# Patient Record
Sex: FEMALE | Race: WHITE | NOT HISPANIC OR LATINO | Employment: PART TIME | URBAN - METROPOLITAN AREA
[De-identification: names, ages, dates, MRNs, and addresses within clinical notes are randomized per-mention and may not be internally consistent; named-entity substitution may affect disease eponyms.]

---

## 2017-06-14 DIAGNOSIS — C50.912 MALIGNANT NEOPLASM OF LEFT FEMALE BREAST (HCC): ICD-10-CM

## 2017-06-14 DIAGNOSIS — Z12.31 ENCOUNTER FOR SCREENING MAMMOGRAM FOR MALIGNANT NEOPLASM OF BREAST: ICD-10-CM

## 2017-06-15 ENCOUNTER — ALLSCRIPTS OFFICE VISIT (OUTPATIENT)
Dept: OTHER | Facility: OTHER | Age: 62
End: 2017-06-15

## 2017-06-16 ENCOUNTER — GENERIC CONVERSION - ENCOUNTER (OUTPATIENT)
Dept: OTHER | Facility: OTHER | Age: 62
End: 2017-06-16

## 2017-06-22 ENCOUNTER — LAB CONVERSION - ENCOUNTER (OUTPATIENT)
Dept: OTHER | Facility: OTHER | Age: 62
End: 2017-06-22

## 2017-06-22 LAB
ADDITIONAL INFORMATION (HISTORICAL): NORMAL
ADEQUACY: (HISTORICAL): NORMAL
COMMENT (HISTORICAL): NORMAL
CYTOTECHNOLOGIST: (HISTORICAL): NORMAL
INTERPRETATION (HISTORICAL): NORMAL
LMP (HISTORICAL): NORMAL
PREV. BX: (HISTORICAL): NORMAL
PREV. PAP (HISTORICAL): NORMAL
SOURCE (HISTORICAL): NORMAL

## 2017-08-09 ENCOUNTER — GENERIC CONVERSION - ENCOUNTER (OUTPATIENT)
Dept: OTHER | Facility: OTHER | Age: 62
End: 2017-08-09

## 2018-01-13 VITALS
SYSTOLIC BLOOD PRESSURE: 128 MMHG | HEIGHT: 58 IN | WEIGHT: 148.25 LBS | BODY MASS INDEX: 31.12 KG/M2 | DIASTOLIC BLOOD PRESSURE: 82 MMHG

## 2018-06-14 ENCOUNTER — ANNUAL EXAM (OUTPATIENT)
Dept: GYNECOLOGY | Facility: CLINIC | Age: 63
End: 2018-06-14
Payer: COMMERCIAL

## 2018-06-14 VITALS
HEIGHT: 57 IN | SYSTOLIC BLOOD PRESSURE: 120 MMHG | WEIGHT: 150.2 LBS | DIASTOLIC BLOOD PRESSURE: 62 MMHG | BODY MASS INDEX: 32.4 KG/M2

## 2018-06-14 DIAGNOSIS — N95.2 ATROPHIC VAGINITIS: ICD-10-CM

## 2018-06-14 DIAGNOSIS — Z01.419 ENCOUNTER FOR GYNECOLOGICAL EXAMINATION WITH PAPANICOLAOU SMEAR OF CERVIX: ICD-10-CM

## 2018-06-14 DIAGNOSIS — Z12.31 ENCOUNTER FOR SCREENING MAMMOGRAM FOR MALIGNANT NEOPLASM OF BREAST: ICD-10-CM

## 2018-06-14 DIAGNOSIS — Z90.12 H/O LEFT MASTECTOMY: ICD-10-CM

## 2018-06-14 DIAGNOSIS — Z13.820 SCREENING FOR OSTEOPOROSIS: Primary | ICD-10-CM

## 2018-06-14 DIAGNOSIS — Z01.419 ENCOUNTER FOR GYNECOLOGICAL EXAMINATION WITHOUT ABNORMAL FINDING: ICD-10-CM

## 2018-06-14 PROCEDURE — 76977 US BONE DENSITY MEASURE: CPT | Performed by: OBSTETRICS & GYNECOLOGY

## 2018-06-14 PROCEDURE — 99396 PREV VISIT EST AGE 40-64: CPT | Performed by: OBSTETRICS & GYNECOLOGY

## 2018-06-14 RX ORDER — BUTALBITAL, ACETAMINOPHEN AND CAFFEINE 50; 325; 40 MG/1; MG/1; MG/1
1 TABLET ORAL
COMMUNITY
Start: 2014-06-30

## 2018-06-14 RX ORDER — PROCHLORPERAZINE MALEATE 5 MG/1
1 TABLET ORAL EVERY 6 HOURS PRN
COMMUNITY
Start: 2014-06-30

## 2018-06-14 RX ORDER — SIMVASTATIN 10 MG
10 TABLET ORAL
COMMUNITY

## 2018-06-14 RX ORDER — MULTIVITAMIN WITH IRON
TABLET ORAL
COMMUNITY

## 2018-06-14 NOTE — PROGRESS NOTES
Assessment/Plan:    No problem-specific Assessment & Plan notes found for this encounter  Diagnoses and all orders for this visit:    Screening for osteoporosis: +2 3    Encounter for screening mammogram for malignant neoplasm of breast  -     Mammo screening right w 3d & cad; Future    H/O left mastectomy  -     Mammo screening right w 3d & cad; Future    Atrophic vaginitis    Encounter for gynecological examination without abnormal finding    Other orders  -     Cancel: Mammo screening bilateral w 3d & cad; Future  -     butalbital-acetaminophen-caffeine (FIORICET,ESGIC) -40 mg per tablet; Take 1 tablet by mouth  -     Calcium Carb-Cholecalciferol (CALCIUM 1000 + D) 1000-800 MG-UNIT TABS; Take by mouth  -     Multiple Vitamin (MULTI-DAY VITAMINS PO); Take by mouth  -     prochlorperazine (COMPAZINE) 5 mg tablet; Take 1 tablet by mouth every 6 (six) hours as needed  -     pyridoxine (VITAMIN B6) 100 mg tablet; Take by mouth  -     simvastatin (ZOCOR) 10 mg tablet; Take 10 mg by mouth daily at bedtime        Subjective:      Patient ID: Leandro Escoto is a 58 y o  female  HPI  Annual exam  No c/o  Hx Lt breast ca age 51, BrCa negative  S/P Lt mastectomy with reconstruction    Colonoscopy 2014 ( 10 )    The following portions of the patient's history were reviewed and updated as appropriate: allergies, current medications, past family history, past medical history, past social history, past surgical history and problem list     Review of Systems   Constitutional: Negative  Gastrointestinal: Negative  Genitourinary: Negative  Objective:      /62   Ht 4' 9" (1 448 m)   Wt 68 1 kg (150 lb 3 2 oz)   BMI 32 50 kg/m²          Physical Exam   Constitutional: She appears well-developed and well-nourished  Neck: Normal range of motion  Neck supple  No thyromegaly present  Cardiovascular: Normal rate, regular rhythm and normal heart sounds      Pulmonary/Chest: Effort normal and breath sounds normal  Right breast exhibits no inverted nipple, no mass, no nipple discharge, no skin change and no tenderness  S/P Lt mastectomy with reconstruction   Abdominal: Soft  Bowel sounds are normal  She exhibits no distension and no mass  There is no tenderness  Hernia confirmed negative in the right inguinal area and confirmed negative in the left inguinal area  Genitourinary: Uterus normal  There is no rash or lesion on the right labia  There is no rash or lesion on the left labia  Right adnexum displays no mass, no tenderness and no fullness  Left adnexum displays no mass, no tenderness and no fullness  No erythema or bleeding in the vagina  No vaginal discharge found  Genitourinary Comments: Extreme narrowing upper vagina, Unable to completely visualize cervix   Lymphadenopathy:        Right: No inguinal adenopathy present  Left: No inguinal adenopathy present

## 2018-06-15 LAB
CLINICAL INFO: NORMAL
DATE PREVIOUS BX: NORMAL
LMP START DATE: NORMAL
SL AMB PREV. PAP:: NORMAL
SPECIMEN SOURCE CVX/VAG CYTO: NORMAL
STAT OF ADQ CVX/VAG CYTO-IMP: NORMAL

## 2019-06-06 ENCOUNTER — ANNUAL EXAM (OUTPATIENT)
Dept: GYNECOLOGY | Facility: CLINIC | Age: 64
End: 2019-06-06
Payer: COMMERCIAL

## 2019-06-06 VITALS
HEART RATE: 91 BPM | SYSTOLIC BLOOD PRESSURE: 140 MMHG | DIASTOLIC BLOOD PRESSURE: 90 MMHG | HEIGHT: 58 IN | WEIGHT: 151.6 LBS | BODY MASS INDEX: 31.82 KG/M2

## 2019-06-06 DIAGNOSIS — Z85.3 HISTORY OF LEFT BREAST CANCER: ICD-10-CM

## 2019-06-06 DIAGNOSIS — Z12.4 ENCOUNTER FOR PAPANICOLAOU SMEAR FOR CERVICAL CANCER SCREENING: ICD-10-CM

## 2019-06-06 DIAGNOSIS — Z12.31 ENCOUNTER FOR SCREENING MAMMOGRAM FOR MALIGNANT NEOPLASM OF BREAST: Primary | ICD-10-CM

## 2019-06-06 DIAGNOSIS — N95.2 ATROPHIC VAGINITIS: ICD-10-CM

## 2019-06-06 PROCEDURE — S0612 ANNUAL GYNECOLOGICAL EXAMINA: HCPCS | Performed by: OBSTETRICS & GYNECOLOGY

## 2019-06-06 PROCEDURE — G0145 SCR C/V CYTO,THINLAYER,RESCR: HCPCS | Performed by: OBSTETRICS & GYNECOLOGY

## 2019-06-06 RX ORDER — FLUTICASONE PROPIONATE 50 MCG
1 SPRAY, SUSPENSION (ML) NASAL DAILY
COMMUNITY

## 2019-06-11 LAB
LAB AP GYN PRIMARY INTERPRETATION: NORMAL
Lab: NORMAL

## 2020-05-29 ENCOUNTER — ANNUAL EXAM (OUTPATIENT)
Dept: GYNECOLOGY | Facility: CLINIC | Age: 65
End: 2020-05-29
Payer: COMMERCIAL

## 2020-05-29 VITALS
DIASTOLIC BLOOD PRESSURE: 90 MMHG | WEIGHT: 152.8 LBS | HEIGHT: 58 IN | SYSTOLIC BLOOD PRESSURE: 150 MMHG | BODY MASS INDEX: 32.07 KG/M2 | HEART RATE: 86 BPM

## 2020-05-29 DIAGNOSIS — Z13.820 SCREENING FOR OSTEOPOROSIS: ICD-10-CM

## 2020-05-29 DIAGNOSIS — N95.2 ATROPHIC VAGINITIS: ICD-10-CM

## 2020-05-29 DIAGNOSIS — Z01.419 ENCOUNTER FOR GYNECOLOGICAL EXAMINATION WITHOUT ABNORMAL FINDING: ICD-10-CM

## 2020-05-29 DIAGNOSIS — Z12.31 ENCOUNTER FOR SCREENING MAMMOGRAM FOR MALIGNANT NEOPLASM OF BREAST: Primary | ICD-10-CM

## 2020-05-29 DIAGNOSIS — Z12.4 ENCOUNTER FOR PAPANICOLAOU SMEAR FOR CERVICAL CANCER SCREENING: ICD-10-CM

## 2020-05-29 DIAGNOSIS — Z85.3 HISTORY OF LEFT BREAST CANCER: ICD-10-CM

## 2020-05-29 PROBLEM — C50.919 BREAST CANCER (HCC): Status: ACTIVE | Noted: 2020-05-29

## 2020-05-29 PROCEDURE — 76977 US BONE DENSITY MEASURE: CPT | Performed by: OBSTETRICS & GYNECOLOGY

## 2020-05-29 PROCEDURE — S0612 ANNUAL GYNECOLOGICAL EXAMINA: HCPCS | Performed by: OBSTETRICS & GYNECOLOGY

## 2020-05-29 PROCEDURE — G0145 SCR C/V CYTO,THINLAYER,RESCR: HCPCS | Performed by: OBSTETRICS & GYNECOLOGY

## 2020-06-08 LAB
LAB AP GYN PRIMARY INTERPRETATION: NORMAL
Lab: NORMAL

## 2021-05-26 ENCOUNTER — ANNUAL EXAM (OUTPATIENT)
Dept: GYNECOLOGY | Facility: CLINIC | Age: 66
End: 2021-05-26
Payer: COMMERCIAL

## 2021-05-26 VITALS
HEART RATE: 89 BPM | DIASTOLIC BLOOD PRESSURE: 82 MMHG | BODY MASS INDEX: 30.04 KG/M2 | WEIGHT: 149 LBS | SYSTOLIC BLOOD PRESSURE: 140 MMHG | HEIGHT: 59 IN

## 2021-05-26 DIAGNOSIS — Z01.419 ENCOUNTER FOR GYNECOLOGICAL EXAMINATION WITH PAPANICOLAOU SMEAR OF CERVIX: Primary | ICD-10-CM

## 2021-05-26 DIAGNOSIS — C50.419 MALIGNANT NEOPLASM OF UPPER-OUTER QUADRANT OF FEMALE BREAST, UNSPECIFIED ESTROGEN RECEPTOR STATUS, UNSPECIFIED LATERALITY (HCC): ICD-10-CM

## 2021-05-26 PROCEDURE — G0145 SCR C/V CYTO,THINLAYER,RESCR: HCPCS | Performed by: OBSTETRICS & GYNECOLOGY

## 2021-05-26 PROCEDURE — 99397 PER PM REEVAL EST PAT 65+ YR: CPT | Performed by: OBSTETRICS & GYNECOLOGY

## 2021-05-26 NOTE — PROGRESS NOTES
Assessment/Plan:    No problem-specific Assessment & Plan notes found for this encounter  Diagnoses and all orders for this visit:    Encounter for gynecological examination with Papanicolaou smear of cervix  -     Liquid-based pap, screening    Malignant neoplasm of upper-outer quadrant of female breast, unspecified estrogen receptor status, unspecified laterality (HCC)        Subjective:      Patient ID: Mark Hanley is a 78 y o  female  HPI patient presents for annual examination  She offers no complaints  She has a history of left breast cancer diagnosed in   She is status post left mastectomy with reconstruction  She denies any vaginal irritation, burning, discharge or bleeding  Denies any dysuria, hematuria urgency or stress urinary incontinence  No GI complaints  Colonoscopy  normal repeat in 10 years     Osteoporosis screening via heel scan May 2020: Positive 1 4  The following portions of the patient's history were reviewed and updated as appropriate:   She  has a past medical history of Arthritis, Breast cancer ([facility] [address] ), and Menopause  She   Patient Active Problem List    Diagnosis Date Noted    Breast cancer ([facility] ) 2020     She  has a past surgical history that includes  section and Mastectomy  Her family history includes Atrial fibrillation in her mother; Breast cancer in her paternal aunt; Heart defect in her father; Hyperlipidemia in her mother; Hypertension in her mother; Prostate cancer in her maternal uncle  She  reports that she has never smoked  She has never used smokeless tobacco  She reports current alcohol use  She reports that she does not use drugs    Current Outpatient Medications   Medication Sig Dispense Refill    butalbital-acetaminophen-caffeine (FIORICET,ESGIC) -40 mg per tablet Take 1 tablet by mouth      Calcium Carb-Cholecalciferol (CALCIUM 1000 + D) 1000-800 MG-UNIT TABS Take by mouth      fluticasone (FLONASE) 50 mcg/act nasal spray 1 spray into each nostril daily      Misc Natural Products (TUMERSAID PO) Take by mouth      Multiple Vitamin (MULTI-DAY VITAMINS PO) Take by mouth      prochlorperazine (COMPAZINE) 5 mg tablet Take 1 tablet by mouth every 6 (six) hours as needed      pyridoxine (VITAMIN B6) 100 mg tablet Take by mouth      simvastatin (ZOCOR) 10 mg tablet Take 10 mg by mouth daily at bedtime       No current facility-administered medications for this visit  Current Outpatient Medications on File Prior to Visit   Medication Sig    butalbital-acetaminophen-caffeine (FIORICET,ESGIC) -40 mg per tablet Take 1 tablet by mouth    Calcium Carb-Cholecalciferol (CALCIUM 1000 + D) 1000-800 MG-UNIT TABS Take by mouth    fluticasone (FLONASE) 50 mcg/act nasal spray 1 spray into each nostril daily    Misc Natural Products (TUMERSAID PO) Take by mouth    Multiple Vitamin (MULTI-DAY VITAMINS PO) Take by mouth    prochlorperazine (COMPAZINE) 5 mg tablet Take 1 tablet by mouth every 6 (six) hours as needed    pyridoxine (VITAMIN B6) 100 mg tablet Take by mouth    simvastatin (ZOCOR) 10 mg tablet Take 10 mg by mouth daily at bedtime     No current facility-administered medications on file prior to visit  She is allergic to aspirin; pollen extract; and tolmetin       Review of Systems   Constitutional: Negative  HENT: Negative for sore throat and trouble swallowing  Gastrointestinal: Negative  Genitourinary: Negative  Objective:      /82 (BP Location: Right arm, Patient Position: Sitting, Cuff Size: Standard)   Pulse 89   Ht 4' 10 5" (1 486 m)   Wt 67 6 kg (149 lb)   BMI 30 61 kg/m²          Physical Exam  Vitals signs reviewed  Constitutional:       Appearance: Normal appearance  She is normal weight  Neck:      Musculoskeletal: Normal range of motion and neck supple  No muscular tenderness  Cardiovascular:      Rate and Rhythm: Normal rate and regular rhythm        Pulses: Normal pulses  Heart sounds: Normal heart sounds  No murmur  Pulmonary:      Effort: Pulmonary effort is normal  No respiratory distress  Breath sounds: Normal breath sounds  Chest:      Breasts:         Right: No swelling, bleeding, inverted nipple, mass, nipple discharge, skin change or tenderness  Comments: Status post left mastectomy with reconstruction  Abdominal:      General: There is no distension  Palpations: Abdomen is soft  There is no mass  Tenderness: There is no abdominal tenderness  There is no guarding or rebound  Hernia: No hernia is present  There is no hernia in the left inguinal area or right inguinal area  Genitourinary:     General: Normal vulva  Labia:         Right: No rash, tenderness or lesion  Left: No rash, tenderness or lesion  Comments: Extreme narrowing the upper vagina  Unable to visualize cervix  Vagina with atrophic changes  Years feels to be anteverted normal size and contour freely mobile nontender  There are no palpable adnexal masses or tenderness  Lymphadenopathy:      Cervical: No cervical adenopathy  Upper Body:      Right upper body: No supraclavicular, axillary or pectoral adenopathy  Left upper body: No supraclavicular, axillary or pectoral adenopathy  Lower Body: No right inguinal adenopathy  No left inguinal adenopathy  Neurological:      Mental Status: She is alert

## 2021-06-01 LAB
LAB AP GYN PRIMARY INTERPRETATION: NORMAL
Lab: NORMAL

## 2022-05-27 ENCOUNTER — ANNUAL EXAM (OUTPATIENT)
Dept: GYNECOLOGY | Facility: CLINIC | Age: 67
End: 2022-05-27
Payer: COMMERCIAL

## 2022-05-27 VITALS
WEIGHT: 142.8 LBS | SYSTOLIC BLOOD PRESSURE: 130 MMHG | HEIGHT: 57 IN | HEART RATE: 84 BPM | BODY MASS INDEX: 30.81 KG/M2 | DIASTOLIC BLOOD PRESSURE: 92 MMHG

## 2022-05-27 DIAGNOSIS — N95.2 ATROPHIC VAGINITIS: ICD-10-CM

## 2022-05-27 DIAGNOSIS — Z13.820 SCREENING FOR OSTEOPOROSIS: ICD-10-CM

## 2022-05-27 DIAGNOSIS — Z85.3 HISTORY OF LEFT BREAST CANCER: Primary | ICD-10-CM

## 2022-05-27 DIAGNOSIS — Z12.4 ENCOUNTER FOR PAPANICOLAOU SMEAR FOR CERVICAL CANCER SCREENING: ICD-10-CM

## 2022-05-27 PROCEDURE — G0145 SCR C/V CYTO,THINLAYER,RESCR: HCPCS | Performed by: OBSTETRICS & GYNECOLOGY

## 2022-05-27 PROCEDURE — G0101 CA SCREEN;PELVIC/BREAST EXAM: HCPCS | Performed by: OBSTETRICS & GYNECOLOGY

## 2022-05-27 PROCEDURE — 76977 US BONE DENSITY MEASURE: CPT | Performed by: OBSTETRICS & GYNECOLOGY

## 2022-05-27 NOTE — PROGRESS NOTES
Assessment/Plan:         Diagnoses and all orders for this visit:    History of left breast cancer    Screening for osteoporosis: heel scan: + 1 6    Atrophic vaginitis: asymptomatic-follow    Other orders  -     Latanoprost 0 005 % EMUL        Subjective:      Patient ID: Laura Mendieta is a 75 y o  female  HPI  patient presents for gyn exam   She has a history of left breast cancer stage I  She is status post left mastectomy with reconstruction  She denies any vaginal irritation, burning, discharge or bleeding  Denies any dysuria, hematuria or stress urinary incontinence  She has mild urgency  No GI complaints  Colon cancer screening via colonoscopy : Negative: Repeat in 10 years    The following portions of the patient's history were reviewed and updated as appropriate:   She  has a past medical history of Arthritis, Breast cancer ([facility] [address] ), and Menopause  She   Patient Active Problem List    Diagnosis Date Noted    [facility] ) 2020     She  has a past surgical history that includes  section and Mastectomy  Her family history includes Atrial fibrillation in her mother; Breast cancer in her paternal aunt; Heart defect in her father; Hyperlipidemia in her mother; Hypertension in her mother; Prostate cancer in her maternal uncle  She  reports that she has never smoked  She has never used smokeless tobacco  She reports current alcohol use  She reports that she does not use drugs    Current Outpatient Medications   Medication Sig Dispense Refill    butalbital-acetaminophen-caffeine (FIORICET,ESGIC) -40 mg per tablet Take 1 tablet by mouth      Calcium Carb-Cholecalciferol 1000-800 MG-UNIT TABS Take by mouth      fluticasone (FLONASE) 50 mcg/act nasal spray 1 spray into each nostril daily      Latanoprost 0 005 % EMUL       Misc Natural Products (TUMERSAID PO) Take by mouth      Multiple Vitamin (MULTI-DAY VITAMINS PO) Take by mouth      prochlorperazine (COMPAZINE) 5 mg tablet Take 1 tablet by mouth every 6 (six) hours as needed      pyridoxine (VITAMIN B6) 100 mg tablet Take by mouth      simvastatin (ZOCOR) 10 mg tablet Take 10 mg by mouth daily at bedtime       No current facility-administered medications for this visit  Current Outpatient Medications on File Prior to Visit   Medication Sig    butalbital-acetaminophen-caffeine (FIORICET,ESGIC) -40 mg per tablet Take 1 tablet by mouth    Calcium Carb-Cholecalciferol 1000-800 MG-UNIT TABS Take by mouth    fluticasone (FLONASE) 50 mcg/act nasal spray 1 spray into each nostril daily    Latanoprost 0 005 % EMUL     Misc Natural Products (TUMERSAID PO) Take by mouth    Multiple Vitamin (MULTI-DAY VITAMINS PO) Take by mouth    prochlorperazine (COMPAZINE) 5 mg tablet Take 1 tablet by mouth every 6 (six) hours as needed    pyridoxine (VITAMIN B6) 100 mg tablet Take by mouth    simvastatin (ZOCOR) 10 mg tablet Take 10 mg by mouth daily at bedtime     No current facility-administered medications on file prior to visit  She is allergic to aspirin, pollen extract, and tolmetin       Review of Systems   Constitutional: Negative  HENT: Negative for sore throat and trouble swallowing  Gastrointestinal: Negative  Genitourinary: Negative  Objective:      /92   Pulse 84   Ht 4' 9" (1 448 m)   Wt 64 8 kg (142 lb 12 8 oz)   BMI 30 90 kg/m²          Physical Exam  Vitals reviewed  Constitutional:       Appearance: Normal appearance  Cardiovascular:      Rate and Rhythm: Normal rate and regular rhythm  Pulses: Normal pulses  Heart sounds: Normal heart sounds  Pulmonary:      Effort: Pulmonary effort is normal       Breath sounds: Normal breath sounds  Abdominal:      General: There is no distension  Palpations: Abdomen is soft  There is no mass  Tenderness: There is no abdominal tenderness  There is no guarding or rebound  Hernia: No hernia is present   There is no hernia in the left inguinal area or right inguinal area  Genitourinary:     General: Normal vulva  Labia:         Right: No rash, tenderness or lesion  Left: No rash, tenderness or lesion  Comments: Extreme narrowing of the upper vagina  Unable to visualize the cervix  No palpable adnexal masses or tenderness  Uterus anteverted normal size and contour freely mobile and nontender  Bartholin's and [place]'s glands normal   Vagina with atrophic changes  Urethral orifice normal   Musculoskeletal:      Cervical back: Normal range of motion and neck supple  Lymphadenopathy:      Lower Body: No right inguinal adenopathy  No left inguinal adenopathy  Neurological:      Mental Status: She is alert

## 2022-06-06 LAB
LAB AP GYN PRIMARY INTERPRETATION: NORMAL
Lab: NORMAL

## 2023-05-26 ENCOUNTER — ANNUAL EXAM (OUTPATIENT)
Dept: GYNECOLOGY | Facility: CLINIC | Age: 68
End: 2023-05-26

## 2023-05-26 VITALS
WEIGHT: 150 LBS | HEIGHT: 57 IN | DIASTOLIC BLOOD PRESSURE: 90 MMHG | HEART RATE: 99 BPM | SYSTOLIC BLOOD PRESSURE: 158 MMHG | BODY MASS INDEX: 32.36 KG/M2

## 2023-05-26 DIAGNOSIS — Z01.419 ENCOUNTER FOR GYNECOLOGICAL EXAMINATION WITHOUT ABNORMAL FINDING: Primary | ICD-10-CM

## 2023-05-26 DIAGNOSIS — N95.2 ATROPHIC VAGINITIS: ICD-10-CM

## 2023-05-26 DIAGNOSIS — Z85.3 HISTORY OF LEFT BREAST CANCER: ICD-10-CM

## 2023-05-26 PROCEDURE — G0143 SCR C/V CYTO,THINLAYER,RESCR: HCPCS | Performed by: OBSTETRICS & GYNECOLOGY

## 2023-05-26 NOTE — PROGRESS NOTES
Assessment/Plan:         Diagnoses and all orders for this visit:    Encounter for gynecological examination without abnormal finding    History of left breast cancer    Atrophic vaginitis; asymptomatic-follow        Subjective:      Patient ID: Jose E Lemon is a 70 y o  female  HPI patient presents for annual examination  She has a history of left breast cancer stage I   Status post left mastectomy  Denies any vaginal irritation, burning, discharge or bleeding  Denies any dysuria, hematuria urgency or urinary incontinence  No GI complaints  Colonoscopy   Negative  Repeat in 10 years  Hemoccult test  negative    Osteoporosis screening via peripheral scan May 2022  +1 6    The following portions of the patient's history were reviewed and updated as appropriate:   She  has a past medical history of Arthritis, Breast cancer ([facility] [address] ), and Menopause  She   Patient Active Problem List    Diagnosis Date Noted   • Breast cancer ([facility] [address] ) 2020     She  has a past surgical history that includes  section and Mastectomy  Her family history includes Atrial fibrillation in her mother; Breast cancer in her paternal aunt; Heart attack in her father; Heart defect in her father; Heart disease in her mother; Hyperlipidemia in her mother; Hypertension in her mother; Prostate cancer in her maternal uncle  She  reports that she has never smoked  She has never used smokeless tobacco  She reports current alcohol use of about 1 0 standard drink of alcohol per week  She reports that she does not use drugs    Current Outpatient Medications   Medication Sig Dispense Refill   • butalbital-acetaminophen-caffeine (FIORICET,ESGIC) -40 mg per tablet Take 1 tablet by mouth     • Calcium Carb-Cholecalciferol 1000-800 MG-UNIT TABS Take by mouth     • fluticasone (FLONASE) 50 mcg/act nasal spray 1 spray into each nostril daily     • Latanoprost 0 005 % EMUL      • Misc Natural Products (TUMERSAID PO) Take by "mouth     • Multiple Vitamin (MULTI-DAY VITAMINS PO) Take by mouth     • prochlorperazine (COMPAZINE) 5 mg tablet Take 1 tablet by mouth every 6 (six) hours as needed     • pyridoxine (VITAMIN B6) 100 mg tablet Take by mouth     • simvastatin (ZOCOR) 10 mg tablet Take 10 mg by mouth daily at bedtime       No current facility-administered medications for this visit  Current Outpatient Medications on File Prior to Visit   Medication Sig   • butalbital-acetaminophen-caffeine (FIORICET,ESGIC) -40 mg per tablet Take 1 tablet by mouth   • Calcium Carb-Cholecalciferol 1000-800 MG-UNIT TABS Take by mouth   • fluticasone (FLONASE) 50 mcg/act nasal spray 1 spray into each nostril daily   • Latanoprost 0 005 % EMUL    • Misc Natural Products (TUMERSAID PO) Take by mouth   • Multiple Vitamin (MULTI-DAY VITAMINS PO) Take by mouth   • prochlorperazine (COMPAZINE) 5 mg tablet Take 1 tablet by mouth every 6 (six) hours as needed   • pyridoxine (VITAMIN B6) 100 mg tablet Take by mouth   • simvastatin (ZOCOR) 10 mg tablet Take 10 mg by mouth daily at bedtime     No current facility-administered medications on file prior to visit  She is allergic to aspirin, pollen extract, and tolmetin       Review of Systems   Constitutional: Negative  HENT: Negative for sore throat and trouble swallowing  Gastrointestinal: Negative  Genitourinary: Negative  Objective:      /90   Pulse 99   Ht 4' 9\" (1 448 m)   Wt 68 kg (150 lb)   BMI 32 46 kg/m²          Physical Exam  Vitals reviewed  Cardiovascular:      Rate and Rhythm: Normal rate and regular rhythm  Pulses: Normal pulses  Heart sounds: Normal heart sounds  No murmur heard  Pulmonary:      Effort: Pulmonary effort is normal  No respiratory distress  Breath sounds: Normal breath sounds  Chest:   Breasts:     Right: No swelling, bleeding, inverted nipple, mass, nipple discharge, skin change or tenderness        Left: No swelling, " bleeding, inverted nipple, mass, nipple discharge, skin change or tenderness  Abdominal:      General: There is no distension  Palpations: Abdomen is soft  There is no mass  Tenderness: There is no abdominal tenderness  There is no guarding or rebound  Hernia: No hernia is present  There is no hernia in the left inguinal area or right inguinal area  Genitourinary:     General: Normal vulva  Labia:         Right: No rash, tenderness or lesion  Left: No rash, tenderness or lesion  Cervix: Normal       Uterus: Normal        Adnexa:         Right: No mass, tenderness or fullness  Left: No mass, tenderness or fullness  Comments: Extreme narrowing of the upper vagina  Bartholin's and [place]'s glands normal   Urethral orifice normal   No cystocele or rectocele  Musculoskeletal:      Cervical back: Normal range of motion and neck supple  No tenderness  Lymphadenopathy:      Cervical: No cervical adenopathy  Upper Body:      Right upper body: No supraclavicular, axillary or pectoral adenopathy  Left upper body: No supraclavicular, axillary or pectoral adenopathy  Lower Body: No right inguinal adenopathy  No left inguinal adenopathy  Neurological:      Mental Status: She is alert

## 2023-06-03 LAB
LAB AP GYN PRIMARY INTERPRETATION: NORMAL
Lab: NORMAL

## 2024-05-24 ENCOUNTER — OFFICE VISIT (OUTPATIENT)
Dept: GYNECOLOGY | Facility: CLINIC | Age: 69
End: 2024-05-24
Payer: COMMERCIAL

## 2024-05-24 VITALS
HEART RATE: 84 BPM | SYSTOLIC BLOOD PRESSURE: 140 MMHG | DIASTOLIC BLOOD PRESSURE: 80 MMHG | WEIGHT: 148.4 LBS | BODY MASS INDEX: 32.01 KG/M2 | HEIGHT: 57 IN

## 2024-05-24 DIAGNOSIS — Z85.3 HISTORY OF LEFT BREAST CANCER: Primary | ICD-10-CM

## 2024-05-24 DIAGNOSIS — Z13.820 SCREENING FOR OSTEOPOROSIS: ICD-10-CM

## 2024-05-24 DIAGNOSIS — Z01.419 ENCOUNTER FOR GYNECOLOGICAL EXAMINATION WITHOUT ABNORMAL FINDING: ICD-10-CM

## 2024-05-24 PROCEDURE — G0145 SCR C/V CYTO,THINLAYER,RESCR: HCPCS | Performed by: OBSTETRICS & GYNECOLOGY

## 2024-05-24 PROCEDURE — 99397 PER PM REEVAL EST PAT 65+ YR: CPT | Performed by: OBSTETRICS & GYNECOLOGY

## 2024-05-24 RX ORDER — ANTIARTHRITIC COMBINATION NO.2 900 MG
TABLET ORAL
COMMUNITY

## 2024-05-24 RX ORDER — DORZOLAMIDE HCL 20 MG/ML
1 SOLUTION/ DROPS OPHTHALMIC 3 TIMES DAILY
COMMUNITY

## 2024-05-24 RX ORDER — LORATADINE 10 MG/1
CAPSULE, LIQUID FILLED ORAL
COMMUNITY

## 2024-05-24 NOTE — PROGRESS NOTES
"Ambulatory Visit  Name: Diana Cardoza      : 1955      MRN: [id number]  Encounter Provider: Julius Timmons DO  Encounter Date: 2024   Encounter department: [facility]    Assessment & Plan   1. History of left breast cancer  2. Encounter for gynecological examination without abnormal finding  3. Screening for osteoporosis    Peripheral scan.  +2.2    History of Present Illness     Diana Cardoza is a 69 y.o. female who presents for annual examination.  She has a history of left breast cancer stage I.  Status post left mastectomy.  She denies any vaginal irritation, burning, discharge or bleeding.  Denies any dysuria, hematuria urgency or urinary incontinence.  No GI complaints.    Colon cancer screening via colonoscopy .  Negative.  Hemoccult test  negative.  PCP is ordered a repeat Hemoccult for this year.    Osteoporosis screening via peripheral scan May 2022.  +1.6    Review of Systems   Constitutional: Negative.    HENT:  Negative for sore throat and trouble swallowing.    Gastrointestinal: Negative.    Genitourinary: Negative.      Medical History Reviewed by provider this encounter:       Objective     /80   Pulse 84   Ht 4' 9\" (1.448 m)   Wt 67.3 kg (148 lb 6.4 oz)   BMI 32.11 kg/m²     Physical Exam  Vitals reviewed.   Cardiovascular:      Rate and Rhythm: Normal rate and regular rhythm.      Pulses: Normal pulses.      Heart sounds: Normal heart sounds. No murmur heard.  Pulmonary:      Effort: Pulmonary effort is normal. No respiratory distress.      Breath sounds: Normal breath sounds.   Chest:   Breasts:     Right: No swelling, bleeding, inverted nipple, mass, nipple discharge, skin change or tenderness.      Left: No swelling, bleeding, inverted nipple, mass, nipple discharge, skin change or tenderness.   Abdominal:      General: There is no distension.      Palpations: Abdomen is soft. There is no mass.      Tenderness: There is " no abdominal tenderness. There is no guarding or rebound.      Hernia: No hernia is present. There is no hernia in the left inguinal area or right inguinal area.   Genitourinary:     General: Normal vulva.      Labia:         Right: No rash, tenderness or lesion.         Left: No rash, tenderness or lesion.       Comments: Extreme narrowing at the upper vagina.  Vaginal atrophic changes.  No palpable adnexal masses or tenderness.  Bartholin's and [place]'s glands normal.  Urethral orifice normal.  No cystocele or rectocele.  Musculoskeletal:      Cervical back: Normal range of motion and neck supple. No tenderness.   Lymphadenopathy:      Cervical: No cervical adenopathy.      Upper Body:      Right upper body: No supraclavicular, axillary or pectoral adenopathy.      Left upper body: No supraclavicular, axillary or pectoral adenopathy.      Lower Body: No right inguinal adenopathy. No left inguinal adenopathy.   Neurological:      Mental Status: She is alert.       Administrative Statements

## 2024-06-04 LAB
LAB AP GYN PRIMARY INTERPRETATION: NORMAL
Lab: NORMAL

## 2025-05-27 ENCOUNTER — ANNUAL EXAM (OUTPATIENT)
Dept: GYNECOLOGY | Facility: CLINIC | Age: 70
End: 2025-05-27
Payer: COMMERCIAL

## 2025-05-27 VITALS
HEART RATE: 87 BPM | BODY MASS INDEX: 33.09 KG/M2 | WEIGHT: 153.4 LBS | HEIGHT: 57 IN | DIASTOLIC BLOOD PRESSURE: 90 MMHG | SYSTOLIC BLOOD PRESSURE: 140 MMHG

## 2025-05-27 DIAGNOSIS — Z01.419 ENCOUNTER FOR GYNECOLOGICAL EXAMINATION WITHOUT ABNORMAL FINDING: ICD-10-CM

## 2025-05-27 DIAGNOSIS — Z85.3 HISTORY OF LEFT BREAST CANCER: Primary | ICD-10-CM

## 2025-05-27 PROCEDURE — G0145 SCR C/V CYTO,THINLAYER,RESCR: HCPCS | Performed by: STUDENT IN AN ORGANIZED HEALTH CARE EDUCATION/TRAINING PROGRAM

## 2025-05-27 PROCEDURE — S0612 ANNUAL GYNECOLOGICAL EXAMINA: HCPCS | Performed by: OBSTETRICS & GYNECOLOGY

## 2025-05-27 RX ORDER — BRIMONIDINE TARTRATE 1.5 MG/ML
1 SOLUTION/ DROPS OPHTHALMIC DAILY
COMMUNITY

## 2025-05-27 NOTE — PROGRESS NOTES
"Name: Diana Cardoza      : 1955      MRN: [id number]  Encounter Provider: Julius Timmons DO  Encounter Date: 2025   Encounter department: [facility]  :  Assessment & Plan  Encounter for screening mammogram for malignant neoplasm of breast    Orders:    Mammo screening bilateral w 3d and cad; Future    History of left breast cancer             History of Present Illness   HPI  Diana Cardoza is a 70 y.o. female who presents for annual examination.  She has a history of left breast cancer diagnosed many years ago.  Stage I.  Status post left mastectomy.  Denies any vaginal irritation, burning, discharge or bleeding.  Denies any dysuria, hematuria urgency urinary incontinence.  No GI complaints.    Osteoporosis screening via peripheral scan May 2024.  +2.2.    Colon cancer screening via Cologuard .  Hemoccult 2024 negative.  Patient is seeing her PCP next week.  She is considering Cologuard      Review of Systems   Constitutional: Negative.    HENT:  Negative for sore throat and trouble swallowing.    Gastrointestinal: Negative.    Genitourinary: Negative.           Objective   Ht 4' 9\" (1.448 m)   Wt 69.6 kg (153 lb 6.4 oz)   BMI 33.20 kg/m²      Physical Exam  Vitals reviewed.     Cardiovascular:      Rate and Rhythm: Normal rate and regular rhythm.      Pulses: Normal pulses.      Heart sounds: Normal heart sounds.   Pulmonary:      Effort: Pulmonary effort is normal. No respiratory distress.      Breath sounds: Normal breath sounds.   Chest:   Breasts:     Right: No swelling, bleeding, inverted nipple, mass, nipple discharge, skin change or tenderness.      Left: Absent. No skin change.   Abdominal:      General: There is no distension.      Palpations: Abdomen is soft. There is no mass.      Tenderness: There is no abdominal tenderness. There is no guarding.      Hernia: No hernia is present. There is no hernia in the left inguinal area or right " inguinal area.   Genitourinary:     General: Normal vulva.      Labia:         Right: No rash, tenderness or lesion.         Left: No rash, tenderness or lesion.       Comments: Extreme narrowing at the upper vagina.  Vaginal atrophic changes.  No palpable adnexal masses or tenderness.  Bartholin's and [place]'s glands normal.  Urethral orifice normal.  No cystocele or rectocele.    Musculoskeletal:      Cervical back: Normal range of motion and neck supple. No tenderness.   Lymphadenopathy:      Cervical: No cervical adenopathy.      Upper Body:      Right upper body: No supraclavicular, axillary or pectoral adenopathy.      Left upper body: No supraclavicular or axillary adenopathy.      Lower Body: No right inguinal adenopathy. No left inguinal adenopathy.     Neurological:      Mental Status: She is alert.            5

## 2025-05-28 ENCOUNTER — TELEPHONE (OUTPATIENT)
Age: 70
End: 2025-05-28

## 2025-05-28 DIAGNOSIS — Z12.31 ENCOUNTER FOR SCREENING MAMMOGRAM FOR MALIGNANT NEOPLASM OF BREAST: Primary | ICD-10-CM

## 2025-06-03 LAB
LAB AP GYN PRIMARY INTERPRETATION: NORMAL
Lab: NORMAL